# Patient Record
(demographics unavailable — no encounter records)

---

## 2025-01-14 NOTE — PHYSICAL EXAM
[Alert] : alert [Oriented x 3] : ~L oriented x 3 [FreeTextEntry3] : - Several ill defined light pink macules with overlying adherent scale on nose and chest - 4 mm yellow superficial subcutaneous nodule on glabella

## 2025-01-14 NOTE — HISTORY OF PRESENT ILLNESS
[FreeTextEntry1] : FU: spots on skin [de-identified] : SUSAN SALAZAR is a 72 year old who is presenting for evaluation of:  #Spot on forehead, present for many years #Rough spots on nose and chest #Hx Guttate psoriasis- diagnosed 3/23/23 i/s/o Strep infection- resolved. Recently had strep again but did not have flare.  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

## 2025-01-14 NOTE — ASSESSMENT
[FreeTextEntry1] : #Neoplasm of uncertain behavior x1, glabella  - Rule out sebaceous hyperplasia vs cyst - Recommend tangential shave biopsy for definitive diagnosis. - After informed consent was obtained, using alcohol pad for cleansing and 1% Lidocaine with epinephrine for anesthetic, with sterile technique a shave biopsy was used to obtain a biopsy specimen of the lesion. Hemostasis was obtained with aluminum chloride. Vaseline was applied, and wound care instructions provided. The specimen was labeled and sent to pathology for evaluation. The procedure was well tolerated without complication. - The patient will be contacted with biopsy results  #SCCis of L chest (bx 12/2023) - s/p 6 weeks efudex  - NER   #AKs x 2, nose and chest - Discussed nature, chronicity and unpredictable course The patient was informed of the pathophysiology of their lesions and their treatment course with liquid nitrogen (cryosurgery). Side effects include blister formation, hypopigmentation, and scarring. Patient was verbally consented and the lesions identified above were treated with liquid nitrogen freeze, thaw, freeze x 10 seconds each cycle x 2. The patient tolerated the procedure well.  Wound care instructions, care of a blister with vaseline, signs and symptoms of infection were discussed in full.  The patient denies any questions at this time.  #Guttate psoriasis - Resolved  RTC 2-3 for FBSE

## 2025-01-14 NOTE — HISTORY OF PRESENT ILLNESS
[FreeTextEntry1] : FU: spots on skin [de-identified] : SUSAN SALAZAR is a 72 year old who is presenting for evaluation of:  #Spot on forehead, present for many years #Rough spots on nose and chest #Hx Guttate psoriasis- diagnosed 3/23/23 i/s/o Strep infection- resolved. Recently had strep again but did not have flare.  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

## 2025-06-12 NOTE — HEALTH RISK ASSESSMENT
[Very Good] : ~his/her~  mood as very good [Yes] : Yes [2 - 3 times a week (3 pts)] : 2 - 3  times a week (3 points) [1 or 2 (0 pts)] : 1 or 2 (0 points) [No falls in past year] : Patient reported no falls in the past year [0] : 2) Feeling down, depressed, or hopeless: Not at all (0) [PHQ-2 Negative - No further assessment needed] : PHQ-2 Negative - No further assessment needed [Former] : Former [> 15 Years] : > 15 Years [Patient reported mammogram was normal] : Patient reported mammogram was normal [Patient reported PAP Smear was normal] : Patient reported PAP Smear was normal [Alone] : lives alone [Retired] : retired [] :  [Fully functional (bathing, dressing, toileting, transferring, walking, feeding)] : Fully functional (bathing, dressing, toileting, transferring, walking, feeding) [Fully functional (using the telephone, shopping, preparing meals, housekeeping, doing laundry, using] : Fully functional and needs no help or supervision to perform IADLs (using the telephone, shopping, preparing meals, housekeeping, doing laundry, using transportation, managing medications and managing finances) [Smoke Detector] : smoke detector [Carbon Monoxide Detector] : carbon monoxide detector [Patient/Caregiver not ready to engage] : , patient/caregiver not ready to engage [de-identified] : daily workout [de-identified] : reg [[id number]] : 0 [Change in mental status noted] : No change in mental status noted [Sexually Active] : not sexually active [Reports changes in hearing] : Reports no changes in hearing [Reports changes in vision] : Reports no changes in vision [Reports changes in dental health] : Reports no changes in dental health [MammogramDate] : 3/2025 [ColonoscopyComments] : scheduled 6/2025 [PapSmearDate] : 3/2025

## 2025-06-12 NOTE — HISTORY OF PRESENT ILLNESS
[de-identified] : 71 y/o F PMHx HTN (Amlodipine) presents for Medicare Wellness PE.Offers no complaints